# Patient Record
Sex: FEMALE | Race: WHITE
[De-identification: names, ages, dates, MRNs, and addresses within clinical notes are randomized per-mention and may not be internally consistent; named-entity substitution may affect disease eponyms.]

---

## 2017-03-21 ENCOUNTER — HOSPITAL ENCOUNTER (EMERGENCY)
Dept: HOSPITAL 59 - ER | Age: 47
Discharge: HOME | End: 2017-03-21
Payer: COMMERCIAL
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
## 2017-03-21 DIAGNOSIS — S60.411A: Primary | ICD-10-CM

## 2017-03-21 PROCEDURE — 87390 HIV-1 AG IA: CPT

## 2017-03-21 PROCEDURE — 99283 EMERGENCY DEPT VISIT LOW MDM: CPT

## 2017-03-21 PROCEDURE — 96372 THER/PROPH/DIAG INJ SC/IM: CPT

## 2017-03-21 NOTE — EMERGENCY DEPARTMENT RECORD
History of Present Illness





- General


Stated Complaint: NEEDLE STICK


Time Seen by Provider: 17 09:56


Source: Patient


Mode of Arrival: Ambulatory


Limitations: No limitations





- History of Present Illness


Initial comments: 


45 yo female presents to ED following a needle stick exposure to the left index 

finger following the administration of a depo-provera injection.  Patient 

reports the injury occurred approximately 1 hours ago, patient immediately 

cleaned the wound following injury.  Patient denies health problems at her 

baseline, reports that her Hepatitis B vaccination is not current.


Onset/Timin


-: Hour(s)


Location: Left, Upper extremity


Radiation: Non-Radiating


Consistency: Constant


Improves with: None


Worsens with: None


Associated Symptoms: Denies other symptoms





- Alanis Coma Scale


Eye Response: (4) Open spontaneously


Motor Response: (6) Obeys commands


Verbal Response: (5) Oriented


Alanis Total: 15





- Related Data


 Home Medications











 Medication  Instructions  Recorded  Confirmed  Last Taken


 


Levonorgestrel-Ethin Estradiol 1 tab PO DAILY 17 Unknown





[Enpresse-28 Tablet]    











 Allergies











Allergy/AdvReac Type Severity Reaction Status Date / Time


 


cefaclor [From [facility]] AdvReac  ANAPHYLAXIS Verified 07/30/15 09:08


 


penicillin AdvReac  ANAPHYLAXIS Verified 07/30/15 09:08














Review of Systems


Constitutional: Denies: Chills, Fever, Malaise, Night sweats


Eyes: Denies: Eye discharge, Eye pain


ENT: Denies: Congestion, Ear pain, Epistaxis


Respiratory: Denies: Cough, Dyspnea


Cardiovascular: Denies: Chest pain, Dyspnea on exertion


Endocrine: Denies: Fatigue, Heat or cold intolerance


Gastrointestinal: Denies: Abdominal pain, Nausea, Vomiting


Genitourinary: Denies: Dysuria, Frequency


Musculoskeletal: Denies: Arthralgia, Back pain, Gout, Joint swelling


Skin: Denies: Bruising, Change in color


Neurological: Denies: Abnormal gait, Confusion, Headache, Seizure


Psychiatric: Denies: Anxiety


Hematological/Lymphatic: Denies: Anemia, Blood Clots





Past Medical History





- SOCIAL HISTORY


Smoking Status: Former smoker





- RESPIRATORY


Hx Respiratory Disorders: No





- CARDIOVASCULAR


Hx Cardio Disorders: No





- NEURO


Hx Neuro Disorders: No





- GI


Hx GI Disorders: No





- 


Hx Genitourinary Disorders: No





- ENDOCRINE


Hx Endocrine Disorders: No





- MUSCULOSKELETAL


Hx Musculoskeletal Disorders: No





- PSYCH


Hx Psych Problems: No





- HEMATOLOGY/ONCOLOGY


Hx Hematology/Oncology Disorders: No





Physical Exam





- General


General Appearance: Alert, Oriented x3, Cooperative


Limitations: No limitations





- Head


Head exam: Atraumatic, Normocephalic, Normal inspection


Head exam detail: negative: Abrasion, Contusion, Dorsey's sign, General 

tenderness, Hematoma, Laceration





- Eye


Eye exam: Normal appearance.  negative: Conjunctival injection, Periorbital 

swelling, Periorbital tenderness, Scleral icterus





- ENT


Ear exam: negative: Auricular hematoma, Auricular trauma


Nasal Exam: negative: Active bleeding, Discharge, Dried blood, Foreign body


Mouth exam: negative: Drooling, Laceration, Muffled voice, Tongue elevation





- Neck


Neck exam: Normal inspection.  negative: Meningismus, Tenderness





- Respiratory


Respiratory exam: Normal lung sounds bilaterally.  negative: Rales, Respiratory 

distress, Rhonchi, Stridor





- Cardiovascular


Cardiovascular Exam: Regular rate, Normal rhythm, Normal heart sounds





- GI/Abdominal


GI/Abdominal exam: Soft.  negative: Rebound, Rigid, Tenderness





- Rectal


Rectal exam: Deferred





- 


 exam: Deferred





- Extremities


Extremities exam: Other (small needle stick injury to the distal left index 

finger.).  negative: Calf tenderness, Pedal edema, Tenderness





- Back


Back exam: Denies: CVA tenderness (R), CVA tenderness (L)





- Neurological


Neurological exam: Alert, Normal gait, Oriented X3





- Psychiatric


Psychiatric exam: Normal affect, Normal mood





- Skin


Skin exam: Normal color.  negative: Abrasion


Type of lesion: negative: abrasion





Course





- Reevaluation(s)


Reevaluation #1: 


17 10:06


Patient was seen and examined, rapid HIV and Hepatitis screen ordered.  

Provided the patient with HIV prophylxis information including risks and 

benefits of initiating therapy, patient does not want to start prophylaxis as 

this time.  Will attempt to reach the source patient for rapid testing as well.

  Will initiate Hepatitis vaccination from the ED, immuneglobulin is not 

currently indicated unless the source patient is positive.





17 10:11








Disposition


Disposition: Discharge


Clinical Impression: 


 Needle stick injury of finger


Disposition: Home, Self-Care


Condition: (2) Stable


Instructions:  Needle Stick Injuries (ED)


Additional Instructions: 


Return to ED if your symptoms worsen or if you have any concerns.


Follow-up with employee health in 1-3 days as directed.


Time of Disposition: 11:56

## 2017-04-23 ENCOUNTER — HOSPITAL ENCOUNTER (EMERGENCY)
Dept: HOSPITAL 59 - ER | Age: 47
Discharge: HOME | End: 2017-04-23
Payer: COMMERCIAL

## 2017-04-23 DIAGNOSIS — K11.8: Primary | ICD-10-CM

## 2017-04-23 PROCEDURE — 99282 EMERGENCY DEPT VISIT SF MDM: CPT

## 2017-04-23 NOTE — EMERGENCY DEPARTMENT RECORD
History of Present Illness





- General


Chief complaint: ENT


Stated complaint: EAR PAIN


Time Seen by Provider: 17 09:01


Source: Patient


Mode of Arrival: Ambulatory


Limitations: No limitations





- History of Present Illness


Initial comments: 


The patient is here due to swelling to her R jaw at the angle of the mandible 

for the last 3 hours. She denies any significant pain, fever, ST or HA. There 

is reported ear popping and mild congestion.





MD complaint: Other


Onset/Timin


-: Days(s)


Location: R ear


Severity: Moderate


Severity scale (1-10): 2


Quality: Aching


Consistency: Constant


Improves with: None





- Related Data


 Home Medications











 Medication  Instructions  Recorded  Confirmed  Last Taken


 


Levonorgestrel-Ethin Estradiol 1 tab PO DAILY 17 1 Day Ago





[Enpresse-28 Tablet]    








 Previous Rx's











 Medication  Instructions  Recorded


 


Clindamycin HCl [Cleocin HCl] 300 mg PO QID #40 capsule 17











 Allergies











Allergy/AdvReac Type Severity Reaction Status Date / Time


 


cefaclor [From Ceclor] AdvReac  ANAPHYLAXIS Verified 17 09:01


 


penicillin AdvReac  ANAPHYLAXIS Verified 17 09:01














Travel Screening





- Travel/Exposure Within Last 30 Days


Have you traveled within the last 30 days?: No





- Travel/Exposure Within Last Year


Have you traveled outside the [place] in the last year?: No





- Additonal Travel Details


Have you been exposed to anyone with a communicable illness?: No





- Travel Symptoms


Symptom Screening: None





Review of Systems


Constitutional: Denies: Chills, Fever


Eyes: Denies: Eye discharge


ENT: Reports: Congestion


Respiratory: Denies: Cough, Dyspnea





Past Medical History





- SOCIAL HISTORY


Smoking Status: Former smoker


Alcohol Use: Occassional


Drug Use: None





- RESPIRATORY


Hx Respiratory Disorders: No





- CARDIOVASCULAR


Hx Cardio Disorders: No





- NEURO


Hx Neuro Disorders: No





- GI


Hx GI Disorders: No





- 


Hx Genitourinary Disorders: No





- ENDOCRINE


Hx Endocrine Disorders: No





- MUSCULOSKELETAL


Hx Musculoskeletal Disorders: No





- PSYCH


Hx Psych Problems: No





- HEMATOLOGY/ONCOLOGY


Hx Hematology/Oncology Disorders: No





Family Medical History


Any Significant Family History?: Yes


Hx Resp Disorders: Mother





Physical Exam





- General


General Appearance: Alert, Oriented x3, Cooperative, No acute distress





- Head


Head exam: Atraumatic, Normocephalic, Normal inspection





- Eye


Eye exam: Normal appearance, PERRL





- ENT


ENT exam: Normal orophraynx, Other (There is significant swelling and mild 

tenderness to the R parotid gland. There is no overlying erythema.).  negative: 

Normal exam, TM's normal bilaterally (The R TM has chronic changes but does not 

appear to be acutely infected.)


Throat exam: Normal inspection.  negative: Tonsillar erythema, Tonsillar exudate





- Neck


Neck exam: Normal inspection, Full ROM, Lymphadenopathy (The R parotid gland is 

swollen and mildly tender.).  negative: Tenderness





- Respiratory


Respiratory exam: Normal lung sounds bilaterally.  negative: Respiratory 

distress





- Cardiovascular


Cardiovascular Exam: Regular rate, Normal rhythm, Normal heart sounds





Course





 Vital Signs











  17





  08:54


 


Temperature 97.8 F


 


Pulse Rate 101 H


 


Respiratory 16





Rate 


 


Blood Pressure 129/76


 


Pulse Ox 100














- Reevaluation(s)


Reevaluation #1: 


I did explain to the patient that it appears that she has  parotid gland 

inflammation. We will treat her with Clindamycin and warm compresses and have 

her recheck tomorrow with her PCP.





17 09:13








Disposition


Disposition: Discharge


Clinical Impression: 


 Pain of parotid gland


Disposition: Home, Self-Care


Condition: (1) Good


Instructions:  Sialoadenitis (ED)


Additional Instructions: 


Please take the clindamycin and use warm compresses on the R parotid gland. Use 

lemon drops during the day. Please see your PCP tomorrow for recheck if not 

better and return to the ER if worse.


Prescriptions: 


Clindamycin HCl [Cleocin HCl] 300 mg PO QID #40 capsule


Forms:  Patient Portal Access


Time of Disposition: 09:16

## 2017-05-01 ENCOUNTER — HOSPITAL ENCOUNTER (OUTPATIENT)
Dept: HOSPITAL 59 - ER | Age: 47
Setting detail: OBSERVATION
LOS: 1 days | Discharge: HOME | End: 2017-05-02
Attending: FAMILY MEDICINE | Admitting: FAMILY MEDICINE
Payer: COMMERCIAL

## 2017-05-01 DIAGNOSIS — R55: Primary | ICD-10-CM

## 2017-05-01 DIAGNOSIS — F17.200: ICD-10-CM

## 2017-05-01 DIAGNOSIS — Z78.9: ICD-10-CM

## 2017-05-01 LAB
ALBUMIN SERPL-MCNC: 4.1 GM/DL (ref 3.5–5)
ALBUMIN/GLOB SERPL: 1.4 {RATIO} (ref 1.1–1.8)
ALP SERPL-CCNC: 82 U/L (ref 38–126)
ALT SERPL-CCNC: 30 U/L (ref 9–52)
ANION GAP SERPL CALC-SCNC: 10.8 MMOL/L (ref 7–16)
AST SERPL-CCNC: 18 U/L (ref 14–36)
BASOPHILS NFR BLD: 0.2 % (ref 0–6)
BILIRUB SERPL-MCNC: 0.71 MG/DL (ref 0.2–1.3)
BUN SERPL-MCNC: 13 MG/DL (ref 7–17)
CK MB SERPL-MCNC: 0.3 UG/L (ref 0–6)
CK MB SERPL-MCNC: 0.3 UG/L (ref 0–6)
CO2 SERPL-SCNC: 23.2 MMOL/L (ref 22–30)
CREAT SERPL-MCNC: 0.6 MG/DL (ref 0.52–1.04)
CREATINE PHOSPHOKINASE: 31 U/L (ref 30–135)
EOSINOPHIL NFR BLD: 0.8 % (ref 0–6)
ERYTHROCYTE [DISTWIDTH] IN BLOOD BY AUTOMATED COUNT: 12.6 % (ref 11.5–14.5)
EST GLOMERULAR FILTRATION RATE: > 60 ML/MIN
GLOBULIN SER-MCNC: 3 GM/DL (ref 1.4–4.8)
GLUCOSE SERPL-MCNC: 112 MG/DL (ref 70–110)
GRANULOCYTES NFR BLD: 64.9 % (ref 47–80)
HCT VFR BLD CALC: 43 % (ref 35–47)
HGB BLD-MCNC: 14.2 GM/DL (ref 11.6–16)
LYMPHOCYTES NFR BLD AUTO: 28.6 % (ref 16–45)
MCH RBC QN AUTO: 34.3 PG (ref 27–33)
MCHC RBC AUTO-ENTMCNC: 33 G/DL (ref 32–36)
MCV RBC AUTO: 103.9 FL (ref 81–97)
MONOCYTES NFR BLD: 5.5 % (ref 0–9)
PLATELET # BLD: 235 K/UL (ref 130–400)
PMV BLD AUTO: 9.3 FL (ref 7.4–10.4)
PROT SERPL-MCNC: 7.1 GM/DL (ref 6.3–8.2)
RBC # BLD AUTO: 4.14 M/UL (ref 3.8–5.4)
TROPONIN I SERPL-MCNC: < 0.012 NG/ML (ref 0–0.03)
TROPONIN I SERPL-MCNC: < 0.012 NG/ML (ref 0–0.03)
WBC # BLD AUTO: 8.6 K/UL (ref 4.2–12.2)

## 2017-05-01 PROCEDURE — 85379 FIBRIN DEGRADATION QUANT: CPT

## 2017-05-01 PROCEDURE — 84703 CHORIONIC GONADOTROPIN ASSAY: CPT

## 2017-05-01 PROCEDURE — 99285 EMERGENCY DEPT VISIT HI MDM: CPT

## 2017-05-01 PROCEDURE — 80048 BASIC METABOLIC PNL TOTAL CA: CPT

## 2017-05-01 PROCEDURE — 70450 CT HEAD/BRAIN W/O DYE: CPT

## 2017-05-01 PROCEDURE — 82550 ASSAY OF CK (CPK): CPT

## 2017-05-01 PROCEDURE — 93880 EXTRACRANIAL BILAT STUDY: CPT

## 2017-05-01 PROCEDURE — 99217: CPT

## 2017-05-01 PROCEDURE — 82553 CREATINE MB FRACTION: CPT

## 2017-05-01 PROCEDURE — 85025 COMPLETE CBC W/AUTO DIFF WBC: CPT

## 2017-05-01 PROCEDURE — 84484 ASSAY OF TROPONIN QUANT: CPT

## 2017-05-01 PROCEDURE — 93005 ELECTROCARDIOGRAM TRACING: CPT

## 2017-05-01 PROCEDURE — 96361 HYDRATE IV INFUSION ADD-ON: CPT

## 2017-05-01 PROCEDURE — 96374 THER/PROPH/DIAG INJ IV PUSH: CPT

## 2017-05-01 PROCEDURE — 93010 ELECTROCARDIOGRAM REPORT: CPT

## 2017-05-01 PROCEDURE — 80053 COMPREHEN METABOLIC PANEL: CPT

## 2017-05-01 PROCEDURE — 99219: CPT

## 2017-05-01 NOTE — EMERGENCY DEPARTMENT RECORD
History of Present Illness





- General


Chief Complaint: Passed out


Time Seen by Provider: 17 13:25


Source: Patient


Mode of Arrival: Stretcher


Limitations: No limitations





- History of Present Illness


Initial Comments: 


The patient is here due to passing out at work a half hour ago. She was walking 

inside from being outside for a very short time and began to feel warm and 

lightheaded. She then sat down on an object due to the lightheadedness and then 

promptly passed out falling forward and hitting her head on cement. She denied 

any CP, SOB, or palpitations prior. When she woke up she saw people standing 

over her. There was no reported seizure activity. The patient now only 

complains of a HA at the site of hitting her head. She states she has a hx of 

passing out in the past and has had a cardiac echo for this which was 

reportedly neg per the patient. She denies any recent illness, fever, chills, 

nausea, vomiting or diarrhea.





MD Complaint: Loss of consciousness


Onset/Timin


-: Minutes(s)


Injuries Sustained Associated with Event: Head


Current Symptoms: Vertigo


Context: Other


Treatments Prior to Arrival: None





- Related Data


 Home Medications











 Medication  Instructions  Recorded  Confirmed  Last Taken


 


Levonorgestrel-Ethin Estradiol 1 tab PO DAILY 17 1 Day Ago





[Enpresse-28 Tablet]    








 Previous Rx's











 Medication  Instructions  Recorded


 


Clindamycin HCl [Cleocin HCl] 300 mg PO QID #40 capsule 17











 Allergies











Allergy/AdvReac Type Severity Reaction Status Date / Time


 


cefaclor [From [facility]] AdvReac  ANAPHYLAXIS Verified 17 09:01


 


penicillin AdvReac  ANAPHYLAXIS Verified 17 09:01














Travel Screening





- Travel/Exposure Within Last 30 Days


Have you traveled within the last 30 days?: No





Review of Systems


Constitutional: Denies: Chills, Fever


Eyes: Denies: Eye discharge


ENT: Denies: Congestion


Respiratory: Denies: Cough, Dyspnea


Cardiovascular: Denies: Arrhythmia, Chest pain


Endocrine: Denies: Fatigue





Past Medical History





- SOCIAL HISTORY


Smoking Status: Former smoker





- RESPIRATORY


Hx Respiratory Disorders: No





- CARDIOVASCULAR


Hx Cardio Disorders: No





- NEURO


Hx Neuro Disorders: No





- GI


Hx GI Disorders: No





- 


Hx Genitourinary Disorders: No





- ENDOCRINE


Hx Endocrine Disorders: No





- MUSCULOSKELETAL


Hx Musculoskeletal Disorders: No





- PSYCH


Hx Psych Problems: No





- HEMATOLOGY/ONCOLOGY


Hx Hematology/Oncology Disorders: No





Family Medical History


Any Significant Family History?: Yes


Hx Resp Disorders: Mother





Physical Exam





- General


General Appearance: Alert, Oriented x3, Cooperative, No acute distress





- Head


Head exam: negative: Atraumatic (There is a hematoma to the mid frontal skull 

area superiorly.), Normal inspection





- Eye


Eye exam: Normal appearance, PERRL, EOMI





- ENT


ENT exam: Normal exam, Mucous membranes moist, Normal external ear exam, Normal 

orophraynx, TM's normal bilaterally





- Neck


Neck exam: Normal inspection, Full ROM.  negative: Tenderness (There is no 

Cspine tenderness and no pain with ROM of the head.)





- Respiratory


Respiratory exam: Normal lung sounds bilaterally.  negative: Respiratory 

distress





- Cardiovascular


Cardiovascular Exam: Regular rate, Normal rhythm, Normal heart sounds





- GI/Abdominal


GI/Abdominal exam: Soft, Normal bowel sounds.  negative: Tenderness





- Extremities


Extremities exam: Normal inspection, Full ROM, Normal capillary refill.  

negative: Tenderness





- Neurological


Neurological exam: Alert.  negative: Motor sensory deficit





Course





 Vital Signs











  17





  13:14


 


Temperature 97.6 F


 


Pulse Rate 98 H


 


Respiratory 20





Rate 


 


Blood Pressure 120/85


 


Pulse Ox 95














- Reevaluation(s)


Reevaluation #1: 


The patient is doing very well at this time. She states her HA is very mild and 

she does not require any pain medicines. 





17 14:13





Reevaluation #2: 


The patient is doing very well at this time and denies any CP, SOB or DAVID. I 

did discuss the issues with the patient and did recommend hospital admission 

overnight and she agrees to the plan. I did then discuss the case with Susie (NP

) and she does agree to accepting the admission for Dr. Mabry.





17 14:50








Medical Decision Making





- Lab Data


Result diagrams: 


 17 13:40





 17 13:40





Disposition


Disposition: Admit


Clinical Impression: 


 Syncope and Collapse


Disposition: Still a Patient at [facility]


Decision to Admit: Admit from ER


Decision to Admit Date: 17


Decision to Admit Time: 14:52


Accepting Physician: Clotilde


Time Discussed w/Accepting Physician: 14:52


Condition: (2) Stable


Forms:  Patient Portal Access


Time of Disposition: 14:52

## 2017-05-01 NOTE — HISTORY & PHYSICAL
History of Present Illness





- Date of Service


Date of Service for History & Physical: 05/01/17





- History of Present Illness


Admitting Diagnosis: 1. Syncope, R/O MI or Arrythmia


History of Present Illness: 


45 y/o with CC syncopal episode admitted for syncope r/o arrythmia, cardiac 

etiology. Reports no significant medical history. 








Reports has had similar episode when she was a young child after being bit by a 

hamster. Family thought due to fear and pain to bite, was not worked up and 

remained asymptomatic with no further episodes until she had seizure-like 

activity at the age of 17 or 18 when she had the chicken pox. Reports she was 

worked up for a seizure disorder at that time with work up being negative. Over 

the last 10 years has had 2 other similar episodes.  Episode #1 about 10 years 

ago, + syncope, had echo which was normal. Episode #2 occurred Dec 2016 which 

she felt a "whoosh" of warmth, flushing, weakness and felt like she was going 

to black out. Laid down on the floor, denies any LOC, and symptoms passed. Was 

not seen by PCP or ED at that time. Is currently taking Clindamycin for 

salivary stone and parotitis on the right diagnosed 2 weeks ago. Denies any 

recent illness, nausea, diarrhea, vomiting, extreme heat conditions.





Today was coming in from break in which she lifted a heavy TV and had a 

cigarette, came into building, felt similar "whoosh" became hot, sweaty, dizzy, 

sat own on raised register and the next thing she knew she was laying on the 

ground with staff around her. Staff witness reports no seizure-like activity, 

LOC only "seconds" and came to. Denies chest pain, palpitations, numbness or 

tingling prior to syncopal episode. At the time of event blood sugar 123, 

/77, neurochecks normal, became oriented x 3 almost immediately after regaining 

consciousness.








While in ED CT head negative, CBC and CMP unremarkable, HCG negative. Ortho 

stats normal. Remained dizzy upon sitting, was given IV Benadryl with 

improvement.





5/1/17- resting in bed comfortable, able to sit up in bed and ambulate to BR 

without dizziness, has hematoma frontal aspect of skull, reports headache pain 2

/10. Denies vision changes, nausea. Abrasion noted left temporal area and left 

check. Otherwise in good spirits.











PCP: Dr Eid








Travel Screening





- Travel/Exposure Within Last 30 Days


Have you traveled within the last 30 days?: No





- Travel/Exposure Within Last Year


Have you traveled outside the [place] in the last year?: No





- Additonal Travel Details


Have you been exposed to anyone with a communicable illness?: No





- Travel Symptoms


Symptom Screening: None





Review of Systems


Constitutional: Denies: Chills, Fever


Eyes: Denies: Eye discharge


ENT: Denies: Congestion


Respiratory: Denies: Cough, Dyspnea


Cardiovascular: Denies: Arrhythmia, Chest pain


Endocrine: Denies: Fatigue





Past Medical History





- SOCIAL HISTORY


Smoking Status: Former smoker


Alcohol Use: Occassional


Drug Use: None





- RESPIRATORY


Hx Respiratory Disorders: No





- CARDIOVASCULAR


Hx Cardio Disorders: No





- NEURO


Hx Neuro Disorders: Yes





- GI


Hx GI Disorders: No





- 


Hx Genitourinary Disorders: No





- ENDOCRINE


Hx Endocrine Disorders: No


Hx Diabetes: No


Hx Thyroid Disease: No





- MUSCULOSKELETAL


Hx Musculoskeletal Disorders: No





- PSYCH


Hx Psych Problems: No





- HEMATOLOGY/ONCOLOGY


Hx Hematology/Oncology Disorders: No





Family Medical History


Any Significant Family History?: Yes


Hx Heart Disease: Father


Hx Resp Disorders: Mother





H&P Meds/Allergies





- Allergies


Allergies: 


 Allergies











Allergy/AdvReac Type Severity Reaction Status Date / Time


 


cefaclor [From Ceclor] AdvReac  ANAPHYLAXIS Verified 04/23/17 09:01


 


penicillin AdvReac  ANAPHYLAXIS Verified 04/23/17 09:01














- Home Medications


 Home Medications











 Medication  Instructions  Recorded  Confirmed  Last Taken


 


Levonorgestrel-Ethin Estradiol 1 tab PO DAILY 03/21/17 05/01/17 1 Day Ago





[Enpresse-28 Tablet]    








 Previous Rx's











 Medication  Instructions  Recorded


 


Clindamycin HCl [Cleocin HCl] 300 mg PO QID #40 capsule 04/23/17














- Active Medications


Active Medications: 


 Current Medications





Acetaminophen (Tylenol 500mg Tab)  1,000 mg PO Q6H PRN


   PRN Reason: PAIN/TEMP


Sodium Chloride ()  1,000 mls @ 100 mls/hr IV .Q10H PRN


   PRN Reason: LARGE VOLUME IV


   Last Admin: 05/01/17 17:00 Dose:  100 mls/hr


Ibuprofen (Motrin 600mg)  600 mg PO Q8H PRN


   PRN Reason: Analgesia


Patient Own Med: (Clindamycin 300 Mg)  [facility]


   Last Admin: 05/01/17 18:18 Dose:  1 each











Physical Exam





- Vital Signs


Vital Signs: 


 Vital Signs - Last 24 Hrs











  Temp Pulse Resp BP Pulse Ox


 


 05/01/17 17:06   79  16  


 


 05/01/17 15:29  98.1 F  79  16  112/71  98














- General


General Appearance: Alert, Oriented x3, Cooperative, No acute distress


Limitations: No limitations





- Head


Head exam: Atraumatic (There is a hematoma to the mid frontal skull area 

superiorly. Mild bogginess just above area of hematoma).  negative: Normal 

inspection


Head exam detail: Abrasion (left temple, left cheek).  negative: Dorsey's sign





- Eye


Eye exam: Normal appearance, PERRL, EOMI


Pupils: Normal accommodation





- ENT


ENT exam: Normal exam, Mucous membranes moist, Normal external ear exam, Normal 

orophraynx, TM's normal bilaterally





- Neck


Neck exam: Normal inspection, Full ROM.  negative: Tenderness (There is no 

Cspine tenderness and no pain with ROM of the head.)





- Respiratory


Respiratory exam: Normal lung sounds bilaterally.  negative: Respiratory 

distress





- Cardiovascular


Cardiovascular Exam: Regular rate, Normal rhythm, Normal heart sounds


Peripheral Pulses: 3+: Dorsalis Pedis (R), Dorsalis Pedis (L)





- GI/Abdominal


GI/Abdominal exam: Soft, Normal bowel sounds.  negative: Tenderness





- Extremities


Extremities exam: Normal inspection, Full ROM, Normal capillary refill.  

negative: Tenderness





- Neurological


Neurological exam: Alert, CN II-XII intact, Oriented X3.  negative: Motor 

sensory deficit





- Psychiatric


Psychiatric exam: Normal affect, Normal mood





- Skin


Skin exam: Abrasion (left temple, left cheek)





Results





- Labs


Result Diagrams: 


 05/01/17 13:40





 05/01/17 13:40





- Imaging and Cardiology


  ** CT scan - head


Status: Report reviewed (no acute process, unremarkable)





VTE H&P Assessment





- Risk for VTE


Risk for VTE: Yes


Risk Level: Low


Risk Assessment Date: 05/01/17


Risk Assessment Time: 19:51


VTE Orders Placed or Will Be Placed: Yes





Plan





- Detailed Diagnosis and Plan


(1) Syncope and collapse


Current Visit: Yes   Status: Acute   Base Code: R55 - SYNCOPE AND COLLAPSE


Comment: 5/1/17- 45 y/o female admitted for syncope and collapse. ED work up 

negative for infection, head trauma. EKG normal, cardiac enzymes # 1 negativ 

for acute MI. Neurochecks normal. Etiology TIA vs cardiac/arrythmia. No 

witessed seizure activity or post-ictal symptoms. Is a former smoker but 

reports has recently began again due to stress.





- serial cardiac enzymes


- cardiology consult


- echo


- carotid dopplers


- D-dimer stat, if elevated will order CTA r/o PE 2nd smoking hx and OBC use


- will likely need cardiology as outpatient for tilt table and holter monitor


- consider neurology consult as outpatient for work atypical seizure disorder














(2) DVT prophylaxis


Current Visit: Yes   Status: Acute   Base Code: [id number] - 


Comment: 5/1/17- nursing to encourag frequnt ambulation








(3) Full code status


Current Visit: Yes   Status: Acute   Base Code: Z78.9 - OTHER SPECIFIED HEALTH 

STATUS


Comment: 5/1/17- will remain full code during this hospitalization.

## 2017-05-02 LAB
ANION GAP SERPL CALC-SCNC: 7.9 MMOL/L (ref 7–16)
BASOPHILS NFR BLD: 0.1 % (ref 0–6)
BUN SERPL-MCNC: 10 MG/DL (ref 7–17)
CK MB SERPL-MCNC: 0.3 UG/L (ref 0–6)
CO2 SERPL-SCNC: 24.1 MMOL/L (ref 22–30)
CREAT SERPL-MCNC: 0.6 MG/DL (ref 0.52–1.04)
EOSINOPHIL NFR BLD: 1.2 % (ref 0–6)
ERYTHROCYTE [DISTWIDTH] IN BLOOD BY AUTOMATED COUNT: 12.8 % (ref 11.5–14.5)
EST GLOMERULAR FILTRATION RATE: > 60 ML/MIN
GLUCOSE SERPL-MCNC: 88 MG/DL (ref 70–110)
GRANULOCYTES NFR BLD: 59.1 % (ref 47–80)
HCT VFR BLD CALC: 38.7 % (ref 35–47)
HGB BLD-MCNC: 13.1 GM/DL (ref 11.6–16)
LYMPHOCYTES NFR BLD AUTO: 32.1 % (ref 16–45)
MCH RBC QN AUTO: 35.2 PG (ref 27–33)
MCHC RBC AUTO-ENTMCNC: 33.9 G/DL (ref 32–36)
MCV RBC AUTO: 104 FL (ref 81–97)
MONOCYTES NFR BLD: 7.5 % (ref 0–9)
PLATELET # BLD: 212 K/UL (ref 130–400)
PMV BLD AUTO: 9.2 FL (ref 7.4–10.4)
RBC # BLD AUTO: 3.72 M/UL (ref 3.8–5.4)
TROPONIN I SERPL-MCNC: < 0.012 NG/ML (ref 0–0.03)
WBC # BLD AUTO: 6.8 K/UL (ref 4.2–12.2)

## 2017-05-02 NOTE — US CAROTID DOPPLER REPORT
EXAM:  DUPLEX DOPPLER ULTRASOUND EXAMINATION OF THE BILATERAL CAROTID ARTERIES



HISTORY: SYNCOPE.  



TECHNIQUE:  Gray scale, color Doppler and duplex Doppler evaluation of the 
bilateral arteries was performed.



FINDINGS:  

  



 3





                Vessel             Right 

      Peak Systolic/

End Diastolic Velocities             Left

     Peak Systolic/

End Diastolic Velocities

 

Internal Carotid Artery 82.5 cm/s/31.9 cm/s 83.4 cm/s/39.8 cm/s

 

Common Carotid Artery 88.8 cm/s/25.4 cm/s 104.3 cm/s/38.1 cm/s

 

External Carotid Artery 94.2 cm/s/13.8 cm/s 86.6 cm/s/15.5 cm/s

 

Vertebral Artery 42.7 cm/s/20.0 cm/s 40.7 cm/s/14.5 cm/s

 

  

 

  

 

  

 

  

  d

 d  d 

 

         Right Flow          Left Flow

 

Vertebral Artery Antegrade Antegrade





The right ICA/CCA ratio is 0.9.    The left ICA/CCA ratio is 0.8.  



No significant atherosclerotic soft or calcified plaques are identified within 
the visualized common carotid, internal carotid, or external carotid arteries.  
No significant dissection is noted.



IMPRESSION:  

UNREMARKABLE CAROTID DUPLEX DOPPLER ULTRASOUND EXAMINATION OF THE BILATERAL 
CAROTID ARTERIES AS DISCUSSED ABOVE.  



JOB NUMBER: [id number]

[facility]

## 2017-05-02 NOTE — DISCHARGE SUMMARY
Providers


Discharge Summary Date: 05/02/17


Date of admission: 


05/01/17 15:28





Expected Date of Discharge: 05/02/17


Attending physician: 


AMBERLY BYERS





Primary care physician: 


NADIA LAU D.O.








Physical Exam





- Vital Signs


Vital Signs: 


 Vital Signs - Last 24 Hrs











  Temp Pulse Resp BP Pulse Ox


 


 05/02/17 09:00   72  16  


 


 05/02/17 08:43  97.7 F  72  16  95/64  97


 


 05/02/17 05:06  98.6 F  75  14  96/63  98


 


 05/02/17 02:00  98.4 F  73  16  98/64  97


 


 05/01/17 22:19  98.7 F  76  16  106/66  96


 


 05/01/17 17:29  98.6 F  88  16  104/59  98


 


 05/01/17 17:06   79  16  


 


 05/01/17 15:29  98.1 F  79  16  112/71  98














- General


General Appearance: Alert, Oriented x3, Cooperative, No acute distress


Limitations: No limitations





- Head


Head exam: Atraumatic (There is a hematoma to the mid frontal skull area 

superiorly.).  negative: Normal inspection


Head exam detail: Abrasion (left temple, left cheek).  negative: Dorsey's sign





- Eye


Eye exam: Normal appearance, PERRL, EOMI


Pupils: Normal accommodation





- ENT


ENT exam: Normal exam, Mucous membranes moist, Normal external ear exam, Normal 

orophraynx, TM's normal bilaterally





- Neck


Neck exam: Normal inspection, Full ROM.  negative: Tenderness (There is no 

Cspine tenderness and no pain with ROM of the head.)





- Respiratory


Respiratory exam: Normal lung sounds bilaterally.  negative: Respiratory 

distress





- Cardiovascular


Cardiovascular Exam: Regular rate, Normal rhythm, Normal heart sounds


Peripheral Pulses: 3+: Dorsalis Pedis (R), Dorsalis Pedis (L)





- GI/Abdominal


GI/Abdominal exam: Soft, Normal bowel sounds.  negative: Tenderness





- Extremities


Extremities exam: Normal inspection, Full ROM, Normal capillary refill.  

negative: Tenderness





- Neurological


Neurological exam: Alert, CN II-XII intact, Oriented X3.  negative: Motor 

sensory deficit





- Psychiatric


Psychiatric exam: Normal affect, Normal mood





- Skin


Skin exam: Abrasion (left temple, left cheek)





Hospitalization





- Hospitalization


Admission Diagnosis: 1. Syncope, R/O MI or Arrythmia





- Problem List/Discharge Diagnosis


(1) Syncope and collapse


Status: Acute   Base Code: R55 - SYNCOPE AND COLLAPSE


Comment: 5/2/17- Patient continues to do well without any furthey syncope.  ED 

work up negative for infection, head trauma. EKG normal, cardiac enzymes 

negative x3. Neurochecks normal. No witessed seizure activity or post-ictal 

symptoms. D-Dimer wnl range. carotid doppler and echo without abnormalities. 

Dr. Ball evaluated patient and did not feel syncopal episode was related to 

cardiac cause but rather vasovagal in nature. 


-will plan to discharge home with pcp follow up in 1 week


-continue to use ibuprofen 600mg po tid prn for generalized, mild headache


-Patient was instructed to follow up with Dr. Ball as an outpatient if she 

continues to have pre-syncopal and syncopal episodes. 


-return to ED for any severe headache, vision changes, or other new/worsening 

symptoms. 














(2) DVT prophylaxis


Status: Acute   Base Code: [id number] - 


Comment: 5/2/17- nursing to encourag frequnt ambulation








(3) Full code status


Status: Acute   Base Code: Z78.9 - OTHER SPECIFIED HEALTH STATUS


Comment: 5/2/17- will remain full code during this hospitalization.











- Hospitalization Course


Disposition: Home, Self-Care


Hospital Course: 


45 y/o with CC syncopal episode admitted for syncope r/o arrythmia, cardiac 

etiology. Reports no significant medical history. 








Reports has had similar episode when she was a young child after being bit by a 

hamster. Family thought due to fear and pain to bite, was not worked up and 

remained asymptomatic with no further episodes until she had seizure-like 

activity at the age of 17 or 18 when she had the chicken pox. Reports she was 

worked up for a seizure disorder at that time with work up being negative. Over 

the last 10 years has had 2 other similar episodes.  Episode #1 about 10 years 

ago, + syncope, had echo which was normal. Episode #2 occurred Dec 2016 which 

she felt a "whoosh" of warmth, flushing, weakness and felt like she was going 

to black out. Laid down on the floor, denies any LOC, and symptoms passed. Was 

not seen by PCP or ED at that time. Is currently taking Clindamycin for 

salivary stone and parotitis on the right diagnosed 2 weeks ago. Denies any 

recent illness, nausea, diarrhea, vomiting, extreme heat conditions.





Today was coming in from break in which she lifted a heavy TV and had a 

cigarette, came into building, felt similar "whoosh" became hot, sweaty, dizzy, 

sat own on raised register and the next thing she knew she was laying on the 

ground with staff around her. Staff witness reports no seizure-like activity, 

LOC only "seconds" and came to. Denies chest pain, palpitations, numbness or 

tingling prior to syncopal episode. At the time of event blood sugar 123, 

/77, neurochecks normal, became oriented x 3 almost immediately after regaining 

consciousness.








While in ED CT head negative, CBC and CMP unremarkable, HCG negative. Ortho 

stats normal. Remained dizzy upon sitting, was given IV Benadryl with 

improvement.





5/1/17- resting in bed comfortable, able to sit up in bed and ambulate to BR 

without dizziness, has hematoma frontal aspect of skull, reports headache pain 2

/10. Denies vision changes, nausea. Abrasion noted left temporal area and left 

check. Otherwise in good spirits.





5/2/17- Patient reports a mild, generalized headache. No vision changes, 

weakness, confusion. She denies any chest pain, arrhythmia, or shortness of 

breath. She reports having at least 6 syncopal episodes throughout her life. 











PCP: Dr Lau


Procedures: 


Cardiology Procedures





05/02/17 12:40


Echo W/CF & Cardiac Doppler NOW 











Abnormal Labs: 


 Abnormal Lab Results











  05/02/17 05/02/17 Range/Units





  05:08 05:08 


 


RBC   3.72 L  (3.80-5.40)  M/uL


 


MCV   104.0 H  (81-97)  fl


 


MCH   35.2 H  (27-33)  pg


 


Chloride  109 H   ()  mmol/L


 


Calcium  8.4 L   (8.5-10.1)  mg/dL











Condition at Discharge: (2) Stable





Discharge Medications





- Discharge Medications


Home Medications: 


 Ambulatory Orders





Levonorgestrel-Ethin Estradiol [Enpresse-28 Tablet] 1 tab PO DAILY 03/21/17 [

Last Taken 1 Day Ago]


Clindamycin HCl [Cleocin HCl] 300 mg PO QID #40 capsule 04/23/17 [Last Taken 

Unknown]











Discharge Plan





- Discharge Instructions


Activity at Discharge: Resume Usual Activities As Tolerated


Diet at Discharge: Regular Diet


Instructions:  Syncope (DC)


Additional Instructions: 





 2





Activity: 


 


 Resume Usual Activities As Tolerated








 2





Diet: AS TOLERATED








 2





Consults: []








 2





Follow Up: []WITH FAMILY DOCTOR AS NEEDED








 2





Dressing/Wound Care:  





 (Type)   (Change) 








 2





Additional: []


MAY FOLLOW UP WITH DR JOCELIN BALL IF NEEDED


CONTINUE HOME MEDS

## 2017-05-02 NOTE — PHYSICIAN PROGRESS NOTE
Subjective





- Date


Date of Physician Progress Note: 05/02/17





- Subjective


Subjective Comment: 


Patient states she is doing well today. She reports generalized headache and 

some tenderness under her left eye where she has an abrasion from falling 

yesterday. She reports some 





Objective





- Vital Signs


Vital Signs: 





 Vital Signs - Last 24 Hrs











  Temp Pulse Resp BP Pulse Ox


 


 05/02/17 08:43  97.7 F  72  16  95/64  97


 


 05/02/17 05:06  98.6 F  75  14  96/63  98


 


 05/02/17 02:00  98.4 F  73  16  98/64  97


 


 05/01/17 22:19  98.7 F  76  16  106/66  96


 


 05/01/17 17:29  98.6 F  88  16  104/59  98


 


 05/01/17 17:06   79  16  


 


 05/01/17 15:29  98.1 F  79  16  112/71  98














- General


General Appearance: Alert, Oriented x3, Cooperative, No acute distress


Limitations: No limitations





- Head


Head exam: Atraumatic (There is a hematoma to the mid frontal skull area 

superiorly. Mild bogginess just above area of hematoma).  negative: Normal 

inspection


Head exam detail: Abrasion (left temple, left cheek).  negative: Dorsey's sign





- Eye


Eye exam: Normal appearance, PERRL, EOMI


Pupils: Normal accommodation





- ENT


ENT exam: Normal exam, Mucous membranes moist, Normal external ear exam, Normal 

orophraynx, TM's normal bilaterally





- Neck


Neck exam: Normal inspection, Full ROM.  negative: Tenderness (There is no 

Cspine tenderness and no pain with ROM of the head.)





- Respiratory


Respiratory exam: Normal lung sounds bilaterally.  negative: Respiratory 

distress





- Cardiovascular


Cardiovascular Exam: Regular rate, Normal rhythm, Normal heart sounds


Peripheral Pulses: 3+: Dorsalis Pedis (R), Dorsalis Pedis (L)





- GI/Abdominal


GI/Abdominal exam: Soft, Normal bowel sounds.  negative: Tenderness





- Extremities


Extremities exam: Normal inspection, Full ROM, Normal capillary refill.  

negative: Tenderness





- Neurological


Neurological exam: Alert, CN II-XII intact, Oriented X3.  negative: Motor 

sensory deficit





- Psychiatric


Psychiatric exam: Normal affect, Normal mood





- Skin


Skin exam: Abrasion (left temple, left cheek)





Results





- Labs


Result Diagrams: 


 05/02/17 05:08





 05/02/17 05:08


Labs Last 24 Hours: 





 Laboratory Results - last 24 hr











  05/01/17 05/01/17 05/02/17





  21:56 22:07 05:08


 


WBC   


 


RBC   


 


Hgb   


 


Hct   


 


MCV   


 


MCH   


 


MCHC   


 


RDW   


 


Plt Count   


 


MPV   


 


Gran %   


 


Lymphocytes %   


 


Monocytes %   


 


Eosinophils %   


 


Basophils %   


 


D-Dimer   0.35 


 


Sodium    141


 


Potassium    4.2


 


Chloride    109 H


 


Carbon Dioxide    24.1


 


Anion Gap    7.9


 


BUN    10


 


Creatinine    0.6


 


Estimated GFR    > 60


 


Random Glucose    88


 


Calcium    8.4 L


 


CK-MB (CK-2)  0.3   0.3


 


Troponin I  < 0.012   < 0.012














  05/02/17 05/02/17





  05:08 06:00


 


WBC  6.8 


 


RBC  3.72 L 


 


Hgb  13.1 


 


Hct  38.7 


 


MCV  104.0 H 


 


MCH  35.2 H 


 


MCHC  33.9 


 


RDW  12.8 


 


Plt Count  212 


 


MPV  9.2 


 


Gran %  59.1 


 


Lymphocytes %  32.1 


 


Monocytes %  7.5 


 


Eosinophils %  1.2 


 


Basophils %  0.1 


 


D-Dimer  


 


Sodium   Cancelled


 


Potassium   Cancelled


 


Chloride   Cancelled


 


Carbon Dioxide   Cancelled


 


Anion Gap   Cancelled


 


BUN   Cancelled


 


Creatinine   Cancelled


 


Estimated GFR   Cancelled


 


Random Glucose   Cancelled


 


Calcium   Cancelled


 


CK-MB (CK-2)  


 


Troponin I  














DVT/PE Assessment





- Risk for VTE


Risk for VTE: No


Risk Level: Low


Risk Assessment Date: 05/01/17


Risk Assessment Time: 19:51


VTE Orders Placed or Will Be Placed: Yes





- Active Medicaitons


Current Medications: 





 Current Medications





Acetaminophen (Tylenol 500mg Tab)  1,000 mg PO Q6H PRN


   PRN Reason: PAIN/TEMP


Sodium Chloride ()  1,000 mls @ 100 mls/hr IV .Q10H PRN


   PRN Reason: LARGE VOLUME IV


   Last Admin: 05/01/17 17:00 Dose:  100 mls/hr


Ibuprofen (Motrin 600mg)  600 mg PO Q8H PRN


   PRN Reason: Analgesia


Patient Own Med: (Clindamycin 300 Mg)  [facility]


   Last Admin: 05/02/17 08:19 Dose:  1 each











AMI Plan





- Labs


Result Diagrams: 


 05/02/17 05:08





 05/02/17 05:08

## 2017-05-02 NOTE — CT SCAN REPORT
EXAM:  CT OF THE BRAIN WITHOUT CONTRAST 



HISTORY:  SYNCOPE.  



TECHNIQUE:  CT of the brain without contrast was obtained.  



Comparison:  None.  



FINDINGS:  The globes are intact.  The paranasal sinuses are unremarkable.  
Trace of fluid in the right mastoid air cells.  No displaced or depressed skull 
fracture.  Small central scalp hematoma.  No acute intracranial hemorrhage.  CT 
is limited for the evaluation of acute infarct.  No CT evidence for large or 
territorial acute infarct.  No mass or midline shift.  



IMPRESSION:  

NEGATIVE FOR ACUTE INTRACRANIAL ABNORMALITY.  SMALL FRONTAL SCALP HEMATOMA.  
TRACE OF FLUID RIGHT MASTOID AIR CELLS.  



JOB NUMBER:  [id number]
MTDD

## 2017-05-03 NOTE — MEDICAL RECORDS CONSULT
DATE OF CONSULTATION: 05/02/2017



PRIMARY CARE PHYSICIAN:  Po Eid D.O. 



REFERRING PHYSICIAN:  Robyn Villalpando M.D. 



REASON FOR CONSULTATION:  Syncope. 



HISTORY OF PRESENT ILLNESS:  The patient is a medical assistant at the 
[employer] and was admitted yesterday after she had a syncopal 
episode at work. 



The patient has had a total of 4 syncopal events throughout her lifetime and 
she had 1 a couple of years ago as well.  She usually had to keep herself well-
hydrated and she was in her usual health except for having some emotional 
stress as her son is getting  soon and there are no other health issues.
  She went out to help one of her coworkers to get something from the car and 
then when she returned back to work she felt slightly lightheaded and then 
passed out.  She says the next thing she recalls was she was in the hospital 
bed.  She denies any palpitations, denies any chest pain.  She is obese and is 
not physically very active, does not have any structured exercise program but 
she has tried home _______ exercises in the past where she was able to do 
without any issues.  She denies any pedal edema, orthopnea or paroxysmal 
nocturnal dyspnea.  



PAST MEDICAL HISTORY:  Significant for syncope as explained above.  She has no 
history of diabetes, hypertension or hyperlipidemia.  



ALLERGIES:  The patient is allergic to CEFACLOR and PENICILLIN and according to 
her she has anaphylaxis reactions from them.  



HOME MEDICATIONS:

1. Oral contraceptive pills. 

2. Clindamycin which she was taking for 10 days. 



FAMILY HISTORY:  No significant family history of coronary artery disease or 
sudden cardiac death. 



SOCIAL HISTORY:  The patient is , has 3 kids.  She has a 20-pack year 
history of smoking which she quit about a year ago.  She drinks only socially 
and last drink was about a week ago.  Denies any illicit drug use. 



REVIEW OF SYSTEMS:  No change in appetite, weight or sleep.  Denies any 
bleeding disorders.  Denies any endocrinologic disorder.  Denies any chest 
pain.  Had a syncopal episode as explained above.  



PHYSICAL EXAMINATION:

VITAL SIGNS:  Systolic blood pressure 102/67 mmHg.  Heart rate 72 per minute.  

GENERAL:  The patient is sitting comfortably in bed, not in any apparent 
distress.  Is alert and oriented x 3. 

HEENT:  She is normocephalic, atraumatic.  Pupils are equal and reactive to 
light.  Extraocular muscles are intact. 

NECK: Supple.  No thyromegaly. 

CVS:  Normal S1 and S2.  There is no JVD, no pedal edema. 

RESPIRATORY:  Lungs are clear to auscultation bilaterally. 

ABDOMEN:  Soft and nontender. 

NEUROLOGIC:  Nonfocal. 



LABORATORY DATA:  White count 6.1, hemoglobin 14.2, platelet count of 232.  BUN 
20, creatinine 1.1, blood glucose 102, sodium 140, potassium 3.9.  AST 29, ALT 
32, alkaline phosphatase 60.  Troponin has been less than 0.012 x 3.  CPK is 
normal at 91.  Calcium is 8.9.  Her EKG shows sinus rhythm with no acute 
changes.  The patient underwent an echocardiogram which shows normal left 
ventricular systolic function and no significant valvular pathology.  



IMPRESSION:  Syncope.  The patient's syncope was a vasovagal syncope and was a 
benign fainting spell.  On the tele monitor she had some PACs and short runs of 
atrial tachycardia however that should not cause her to have a syncopal 
episode.  I have asked the patient to keep herself well-hydrated; however, in 
the future if she has more of these episodes then we can consider compression 
stockings or either doing a tilt table test, however at this point in time I do 
not feel the need for any further testing therefore I gave reassurance to her 
and did counseling that if she feels lightheaded and dizzy she should sit down 
or lay down rather than fighting it as it will end up in a syncope if she does 
not change her posture to a more dependent posture. 



She will continue to follow up with Dr. Eid on a regular basis and can see me 
as outpatient only on an as needed basis. 





_________________________

Gris Santizo MD



CC: Robyn Villalpando M.D.

GERONIMO

## 2019-11-11 ENCOUNTER — HOSPITAL ENCOUNTER (OUTPATIENT)
Dept: HOSPITAL 59 - SUR | Age: 49
Discharge: HOME | End: 2019-11-11
Attending: SURGERY
Payer: COMMERCIAL

## 2019-11-11 DIAGNOSIS — K81.2: Primary | ICD-10-CM

## 2019-11-11 DIAGNOSIS — K76.0: ICD-10-CM

## 2019-11-11 DIAGNOSIS — E66.9: ICD-10-CM

## 2019-11-11 PROCEDURE — 81025 URINE PREGNANCY TEST: CPT

## 2019-11-12 NOTE — OPERATIVE NOTE
DATE OF SURGERY: 11/11/2019



SURGEON: Mina Lozano DO



PREOPERATIVE DIAGNOSIS: Chronic cholecystitis. 



POSTOPERATIVE DIAGNOSIS: Acute on chronic cholecystitis. 



OPERATION: Laparoscopic cholecystectomy. 



INDICATION: The patient is a 49-year-old female who presented to the clinic with
ongoing right subcostal postprandial pain. This has been a chronic issue for 
her. Imaging studies did reveal cholelithiasis with a thickened gallbladder 
wall. We did discuss cholecystectomy versus medical management. She desired 
surgical intervention. Risks include but are not limited to bleeding, infection,
ductal injury, possible conversion to open, postoperative bile leak. She 
understood this fully. 



PROCEDURE: Thereafter, consent was signed and questions answered. She was taken 
to the operating room and placed in a supine position. General anesthesia was 
administered per the department of anesthesia. The patient's abdomen was prepped
and draped in the usual sterile fashion. I did have to go in a supraumbilical 
position secondary to patient's body habitus. This area was anesthetized with a 
total of 5 mL of 0.25% Sensorcaine with epinephrine. A 2 cm supraumbilical 
incision was made. This was carried down bluntly to the anterior rectus fascia. 
This was incised. Kocher clamps were placed on the fascial edges and brought up 
into the wound. Stay sutures of 0 Vicryl were placed. Posterior rectus sheath 
was identified and incised. The peritoneal cavity was entered bluntly. At this 
time, a 10 mm blunt Mayo port was placed. Adequate pneumoperitoneum was 
established. Under direct visualization, additional 5 mm epigastric and two 5 mm
right subcostal ports were placed. The patient was rotated into steep reverse 
Trendelenburg with rotation to left. I could only see the fundus of the 
gallbladder. This was covered with dense omental adhesions. This was lifted 
anteriorly. The gallbladder was noted to be very thickened and inflamed. I did 
have to use traumatic graspers. The patient also had severe fatty infiltration 
of the liver noted. The gallbladder was retracted over the right shoulder. This 
did rupture the gallbladder spilling purulent fluid. This was then suctioned 
free. Further cephalad and lateral traction was applied. The hepatocystic 
triangle was then thoroughly dissected out. There was no aberrant anatomy, no 
posterior ductal structures. The patient did have a fairly sizable cystic 
artery. Did release the distal half of the gallbladder from the liver plate 
elongating our retroductal window. There was no aberrant anatomy, no posterior 
ductal structures. The cystic duct and cystic artery were the only structures 
available. Each one was doubly clipped and cut in a standard fashion. 
Gallbladder essentially peeled off the liver bed secondary to the weight of the 
liver. This was placed in EndoCatch bag and brought out through the umbilical 
port. Right upper quadrant was then irrigated with approximately 2 liters of 
normal saline. There was bile leaking. A few points in the liver were controlled
with cautery as well as FloSeal. At this time, the patient was leveled out. 
Pneumoperitoneum was released. All ports were removed. The fascia was closed 
with 0 Vicryl in a figure-of-eight fashion. The skin at all ports was closed 
with 4-0 Vicryl. The patient was taken to the recovery room in stable condition.




FINDINGS AT THE TIME OF SURGERY: Acute on chronic cholecystitis. Severe fatty 
infiltration of the liver. 

MTDD